# Patient Record
Sex: FEMALE | Race: WHITE | NOT HISPANIC OR LATINO | Employment: OTHER | URBAN - METROPOLITAN AREA
[De-identification: names, ages, dates, MRNs, and addresses within clinical notes are randomized per-mention and may not be internally consistent; named-entity substitution may affect disease eponyms.]

---

## 2020-09-30 ENCOUNTER — ROUTINE PRENATAL (OUTPATIENT)
Dept: OBSTETRICS AND GYNECOLOGY | Facility: CLINIC | Age: 30
End: 2020-09-30

## 2020-09-30 VITALS — SYSTOLIC BLOOD PRESSURE: 100 MMHG | WEIGHT: 172 LBS | DIASTOLIC BLOOD PRESSURE: 70 MMHG

## 2020-09-30 DIAGNOSIS — Z3A.17 17 WEEKS GESTATION OF PREGNANCY: Primary | ICD-10-CM

## 2020-09-30 LAB
GLUCOSE UR STRIP-MCNC: NEGATIVE MG/DL
PROT UR STRIP-MCNC: NEGATIVE MG/DL

## 2020-09-30 PROCEDURE — 0502F SUBSEQUENT PRENATAL CARE: CPT | Performed by: NURSE PRACTITIONER

## 2020-09-30 NOTE — PROGRESS NOTES
OB FOLLOW UP  CC- Here for care of pregnancy        Jyoti Conde is a 30 y.o.  17w2d patient being seen today for her obstetrical follow up visit. Patient reports no complaints. Declines AFP. Feels well, no c/o.    Her prenatal care is complicated by (and status) :    There is no problem list on file for this patient.      Flu Status: Will get at work/pharmacy/other facility     The additional following portions of the patient's history were reviewed and updated as appropriate: allergies, current medications, past family history, past medical history, past social history, past surgical history and problem list.    ROS -   Patient Reports : No Problems  Patient Denies: Loss of Fluid, Vaginal Spotting, Vision Changes, Headaches and Cramping/Contractions   Fetal Movement : none at this gestation  All other systems reviewed and are negative.     I have reviewed and agree with the HPI, ROS, and historical information as entered above. Mena Encinas, APRN    /70   Wt 78 kg (172 lb)     Ultrasound Today: no    EXAM:   Vitals: See prenatal flowsheet   Abdomen: See prenatal flowsheet   Urine glucose/protein: See prenatal flowsheet   Pelvic: See prenatal flowsheet   HRT: See prenatal flowsheet   Presentation: See prenatal flowsheet   Movement: See prenatal flowsheet          Assessment and Plan    Problem List Items Addressed This Visit     None      Visit Diagnoses     17 weeks gestation of pregnancy    -  Primary    Relevant Orders    POC Urinalysis Dipstick, Automated (Completed)          1. Pregnancy at 17w2d  2. Fetal status reassuring.   3. Activity and Exercise discussed.  3-4 wk anatomy US  She declines cfDNA, AFP.    Mena Encinas, APRN  2020

## 2020-10-05 ENCOUNTER — TELEPHONE (OUTPATIENT)
Dept: OBSTETRICS AND GYNECOLOGY | Facility: CLINIC | Age: 30
End: 2020-10-05

## 2020-10-06 ENCOUNTER — TELEPHONE (OUTPATIENT)
Dept: OBSTETRICS AND GYNECOLOGY | Facility: CLINIC | Age: 30
End: 2020-10-06

## 2020-10-06 RX ORDER — CITALOPRAM 10 MG/1
10 TABLET ORAL DAILY
Qty: 30 TABLET | Refills: 5 | Status: SHIPPED | OUTPATIENT
Start: 2020-10-06 | End: 2021-04-04

## 2020-10-06 NOTE — TELEPHONE ENCOUNTER
[facility] pt, , 18 1/7wks  Pt left VM needing Rx sent in for anxiety and depression.  Per [facility]; pt can start Celexa 10mg  Detailed message left on VM    Rx for Celexa sent

## 2022-12-27 ENCOUNTER — HOSPITAL ENCOUNTER (OUTPATIENT)
Dept: GENERAL RADIOLOGY | Facility: HOSPITAL | Age: 32
Discharge: HOME | End: 2022-12-27
Admitting: NURSE PRACTITIONER

## 2022-12-27 ENCOUNTER — TRANSCRIBE ORDERS (OUTPATIENT)
Dept: ADMINISTRATIVE | Facility: HOSPITAL | Age: 32
End: 2022-12-27

## 2022-12-27 DIAGNOSIS — M25.571 PAIN, JOINT, ANKLE, RIGHT: Primary | ICD-10-CM

## 2022-12-27 PROCEDURE — 73610 X-RAY EXAM OF ANKLE: CPT
